# Patient Record
Sex: MALE | Race: WHITE | NOT HISPANIC OR LATINO | URBAN - METROPOLITAN AREA
[De-identification: names, ages, dates, MRNs, and addresses within clinical notes are randomized per-mention and may not be internally consistent; named-entity substitution may affect disease eponyms.]

---

## 2019-11-26 ENCOUNTER — SKIN CHECK (OUTPATIENT)
Dept: URBAN - METROPOLITAN AREA CLINIC 8 | Facility: CLINIC | Age: 44
Setting detail: DERMATOLOGY
End: 2019-11-26

## 2019-11-26 DIAGNOSIS — Z85.828 PERSONAL HISTORY OF OTHER MALIGNANT NEOPLASM OF SKIN: ICD-10-CM

## 2019-11-26 DIAGNOSIS — L57.0 ACTINIC KERATOSIS: ICD-10-CM

## 2019-11-26 DIAGNOSIS — L82.1 OTHER SEBORRHEIC KERATOSIS: ICD-10-CM

## 2019-11-26 PROCEDURE — 99203 OFFICE O/P NEW LOW 30 MIN: CPT

## 2019-11-26 RX ORDER — FLUOCINOLONE ACETONIDE 0.11 MG/ML
1 ML OIL TOPICAL DAILY
Qty: 118.28 | Refills: 1
Start: 2019-11-26

## 2019-11-26 RX ORDER — KETOCONAZOLE 20 MG/G
1 APPLICATION CREAM TOPICAL DAILY
Qty: 60 | Refills: 6
Start: 2019-11-26

## 2019-12-06 ENCOUNTER — RX ONLY (RX ONLY)
Age: 44
End: 2019-12-06

## 2019-12-06 RX ORDER — SULFAMETHOXAZOLE AND TRIMETHOPRIM 800; 160 MG/1; MG/1
1 TABLET TABLET ORAL BID
Qty: 60 | Refills: 0
Start: 2019-12-06

## 2019-12-10 ENCOUNTER — FOLLOW-UP (OUTPATIENT)
Dept: URBAN - METROPOLITAN AREA CLINIC 8 | Facility: CLINIC | Age: 44
Setting detail: DERMATOLOGY
End: 2019-12-10

## 2019-12-10 ENCOUNTER — RX ONLY (RX ONLY)
Age: 44
End: 2019-12-10

## 2019-12-10 DIAGNOSIS — L82.0 INFLAMED SEBORRHEIC KERATOSIS: ICD-10-CM

## 2019-12-10 PROCEDURE — 99213 OFFICE O/P EST LOW 20 MIN: CPT

## 2019-12-10 RX ORDER — AMOXICILLIN AND CLAVULANATE POTASSIUM 500; 125 MG/1; 1/1
1 TABLET TABLET, FILM COATED ORAL BID
Qty: 28 | Refills: 0
Start: 2019-12-10

## 2020-09-09 ENCOUNTER — RX ONLY (RX ONLY)
Age: 45
End: 2020-09-09

## 2020-09-09 ENCOUNTER — OTHER- (OUTPATIENT)
Dept: URBAN - METROPOLITAN AREA CLINIC 8 | Facility: CLINIC | Age: 45
Setting detail: DERMATOLOGY
End: 2020-09-09

## 2020-09-09 DIAGNOSIS — C44.311 BASAL CELL CARCINOMA OF SKIN OF NOSE: ICD-10-CM

## 2020-09-09 PROCEDURE — 99214 OFFICE O/P EST MOD 30 MIN: CPT

## 2020-09-09 RX ORDER — TRIAMCINOLONE ACETONIDE 1 MG/G
1 APPLICATION CREAM TOPICAL BID
Qty: 30 | Refills: 2
Start: 2020-09-09

## 2020-09-09 RX ORDER — FLUOCINOLONE ACETONIDE 0.11 MG/ML
1 ML OIL TOPICAL BID
Qty: 118.28 | Refills: 2
Start: 2020-09-09

## 2020-09-09 RX ORDER — NYSTATIN 100000 [USP'U]/G
1 APPLICATION CREAM TOPICAL BID
Qty: 30 | Refills: 2
Start: 2020-09-09

## 2020-11-17 ENCOUNTER — RX ONLY (RX ONLY)
Age: 45
End: 2020-11-17

## 2020-11-17 RX ORDER — KETOCONAZOLE 20 MG/G
1 A SMALL AMOUNT CREAM TOPICAL TWICE A DAY
Qty: 60 | Refills: 3
Start: 2020-11-17

## 2020-12-02 ENCOUNTER — FOLLOW-UP (OUTPATIENT)
Dept: URBAN - METROPOLITAN AREA CLINIC 8 | Facility: CLINIC | Age: 45
Setting detail: DERMATOLOGY
End: 2020-12-02

## 2020-12-02 DIAGNOSIS — L57.0 ACTINIC KERATOSIS: ICD-10-CM

## 2020-12-02 PROCEDURE — 99213 OFFICE O/P EST LOW 20 MIN: CPT

## 2021-05-23 ENCOUNTER — RX ONLY (RX ONLY)
Age: 46
End: 2021-05-23

## 2021-05-23 RX ORDER — KETOCONAZOLE 20 MG/G
CREAM TOPICAL
Qty: 60 | Refills: 0
Start: 2021-05-23

## 2023-09-26 ENCOUNTER — APPOINTMENT (OUTPATIENT)
Dept: URBAN - METROPOLITAN AREA CLINIC 215 | Age: 48
Setting detail: DERMATOLOGY
End: 2023-09-26

## 2023-09-26 DIAGNOSIS — B37.2 CANDIDIASIS OF SKIN AND NAIL: ICD-10-CM

## 2023-09-26 DIAGNOSIS — L30.4 ERYTHEMA INTERTRIGO: ICD-10-CM

## 2023-09-26 DIAGNOSIS — L21.8 OTHER SEBORRHEIC DERMATITIS: ICD-10-CM

## 2023-09-26 DIAGNOSIS — I89.0 LYMPHEDEMA, NOT ELSEWHERE CLASSIFIED: ICD-10-CM

## 2023-09-26 PROCEDURE — OTHER COUNSELING: OTHER

## 2023-09-26 PROCEDURE — OTHER ADDITIONAL NOTES: OTHER

## 2023-09-26 PROCEDURE — OTHER PRESCRIPTION: OTHER

## 2023-09-26 PROCEDURE — 99214 OFFICE O/P EST MOD 30 MIN: CPT

## 2023-09-26 PROCEDURE — OTHER PRESCRIPTION MEDICATION MANAGEMENT: OTHER

## 2023-09-26 RX ORDER — NYSTATIN 100000 [USP'U]/G
OINTMENT TOPICAL
Qty: 30 | Refills: 5 | Status: ERX | COMMUNITY
Start: 2023-09-26

## 2023-09-26 RX ORDER — FLUOCINOLONE ACETONIDE 0.1 MG/ML
SOLUTION TOPICAL
Qty: 60 | Refills: 1 | Status: ERX | COMMUNITY
Start: 2023-09-26

## 2023-09-26 ASSESSMENT — LOCATION DETAILED DESCRIPTION DERM
LOCATION DETAILED: RIGHT INGUINAL CREASE
LOCATION DETAILED: RIGHT DISTAL PRETIBIAL REGION
LOCATION DETAILED: LEFT POSTAURICULAR CREASE
LOCATION DETAILED: RIGHT ANTERIOR PROXIMAL THIGH
LOCATION DETAILED: LEFT DISTAL PRETIBIAL REGION
LOCATION DETAILED: DISTAL DORSAL PENILE SHAFT
LOCATION DETAILED: LEFT ANTERIOR PROXIMAL THIGH
LOCATION DETAILED: RIGHT POSTERIOR EAR

## 2023-09-26 ASSESSMENT — LOCATION ZONE DERM
LOCATION ZONE: LEG
LOCATION ZONE: PENIS
LOCATION ZONE: EAR
LOCATION ZONE: TRUNK

## 2023-09-26 ASSESSMENT — LOCATION SIMPLE DESCRIPTION DERM
LOCATION SIMPLE: RIGHT EAR
LOCATION SIMPLE: DORSAL PENIS
LOCATION SIMPLE: RIGHT PRETIBIAL REGION
LOCATION SIMPLE: LEFT EAR
LOCATION SIMPLE: LEFT PRETIBIAL REGION
LOCATION SIMPLE: GROIN
LOCATION SIMPLE: LEFT THIGH
LOCATION SIMPLE: RIGHT THIGH

## 2023-09-26 ASSESSMENT — SEVERITY ASSESSMENT: HOW SEVERE IS THIS PATIENT'S CONDITION?: MODERATE

## 2023-09-26 NOTE — HPI: RASH (SEBORRHEIC DERMATITIS)
Is This A New Presentation, Or A Follow-Up?: Rash
Additional History: Patient states he was prescribed a medication in the past for flaking and itching which does help. He last used topical a couple days ago.

## 2023-09-26 NOTE — HPI: SKIN LESION
Is This A New Presentation, Or A Follow-Up?: Skin Lesions
Additional History: Patient states his caregiver noticed spots while helping him bathe. She told him she thought he has blackheads on his scrotum and 2 small clear bubbles and the tip of his penis. Patient states spots kind of look like herpes. He reports his scrotum itches a lot and may be yeast

## 2023-09-26 NOTE — PROCEDURE: ADDITIONAL NOTES
Additional Notes: Patient reports he [place] [facility] [place] Additional Notes: Patient reports he sees [facility] [place] [facility] [facility]

## 2024-03-03 NOTE — PROCEDURE: PRESCRIPTION MEDICATION MANAGEMENT
As discussed, your heart rate was in the 130s to 160s off of metoprolol, and has responded nicely to resuming it at the low-dose.      In the short-term, I recommend you resume at 25 mg once a day and let Dr. Walters know about this episode for further recommendations.     Other evaluation is fine.          
Render In Strict Bullet Format?: No
Initiate Treatment: Nystatin BID until better.  Be sure to have caregiver thoroughly clean this area, needing to retract the foreskin when doing so and then apply Nystatin.
Detail Level: Zone